# Patient Record
Sex: FEMALE | Race: WHITE | Employment: PART TIME | URBAN - METROPOLITAN AREA
[De-identification: names, ages, dates, MRNs, and addresses within clinical notes are randomized per-mention and may not be internally consistent; named-entity substitution may affect disease eponyms.]

---

## 2021-07-09 ENCOUNTER — OFFICE VISIT (OUTPATIENT)
Dept: OBGYN CLINIC | Age: 21
End: 2021-07-09
Payer: COMMERCIAL

## 2021-07-09 VITALS
DIASTOLIC BLOOD PRESSURE: 60 MMHG | HEIGHT: 64 IN | TEMPERATURE: 97.9 F | HEART RATE: 101 BPM | SYSTOLIC BLOOD PRESSURE: 108 MMHG

## 2021-07-09 DIAGNOSIS — F32.81 PMDD (PREMENSTRUAL DYSPHORIC DISORDER): ICD-10-CM

## 2021-07-09 DIAGNOSIS — N94.6 DYSMENORRHEA: ICD-10-CM

## 2021-07-09 DIAGNOSIS — Z53.8 PAP SMEAR OF CERVIX NOT NEEDED: ICD-10-CM

## 2021-07-09 DIAGNOSIS — L68.9 EXCESSIVE HAIR GROWTH: ICD-10-CM

## 2021-07-09 DIAGNOSIS — N92.6 IRREGULAR MENSES: Primary | ICD-10-CM

## 2021-07-09 DIAGNOSIS — Z87.42 HISTORY OF PCOS: ICD-10-CM

## 2021-07-09 PROCEDURE — 99203 OFFICE O/P NEW LOW 30 MIN: CPT | Performed by: OBSTETRICS & GYNECOLOGY

## 2021-07-09 RX ORDER — BUPROPION HYDROCHLORIDE 150 MG/1
150 TABLET ORAL EVERY MORNING
COMMUNITY

## 2021-07-10 LAB
BASOPHILS ABSOLUTE: 0.1 K/UL (ref 0–0.2)
BASOPHILS RELATIVE PERCENT: 0.8 %
EOSINOPHILS ABSOLUTE: 0.1 K/UL (ref 0–0.6)
EOSINOPHILS RELATIVE PERCENT: 1.2 %
ESTIMATED AVERAGE GLUCOSE: 99.7 MG/DL
GLUCOSE FASTING: 76 MG/DL (ref 70–99)
HBA1C MFR BLD: 5.1 %
HCT VFR BLD CALC: 43.7 % (ref 36–48)
HEMOGLOBIN: 14.7 G/DL (ref 12–16)
LYMPHOCYTES ABSOLUTE: 1.7 K/UL (ref 1–5.1)
LYMPHOCYTES RELATIVE PERCENT: 24.7 %
MCH RBC QN AUTO: 28.9 PG (ref 26–34)
MCHC RBC AUTO-ENTMCNC: 33.6 G/DL (ref 31–36)
MCV RBC AUTO: 86 FL (ref 80–100)
MONOCYTES ABSOLUTE: 0.4 K/UL (ref 0–1.3)
MONOCYTES RELATIVE PERCENT: 5.5 %
NEUTROPHILS ABSOLUTE: 4.7 K/UL (ref 1.7–7.7)
NEUTROPHILS RELATIVE PERCENT: 67.8 %
PDW BLD-RTO: 13.9 % (ref 12.4–15.4)
PLATELET # BLD: 262 K/UL (ref 135–450)
PMV BLD AUTO: 8.4 FL (ref 5–10.5)
PROLACTIN: 24.4 NG/ML
RBC # BLD: 5.08 M/UL (ref 4–5.2)
T4 FREE: 1.4 NG/DL (ref 0.9–1.8)
TSH REFLEX: 2.14 UIU/ML (ref 0.27–4.2)
WBC # BLD: 7 K/UL (ref 4–11)

## 2021-07-11 PROBLEM — Z87.42 HISTORY OF PCOS: Status: ACTIVE | Noted: 2021-07-11

## 2021-07-11 PROBLEM — F32.81 PMDD (PREMENSTRUAL DYSPHORIC DISORDER): Status: ACTIVE | Noted: 2021-07-11

## 2021-07-11 ASSESSMENT — ENCOUNTER SYMPTOMS
VOMITING: 0
SHORTNESS OF BREATH: 0
NAUSEA: 0
ROS SKIN COMMENTS: EXCESSIVE HAIR GROWTH
CONSTIPATION: 0
ABDOMINAL PAIN: 0
ABDOMINAL DISTENTION: 0
DIARRHEA: 0

## 2021-07-11 NOTE — PROGRESS NOTES
Subjective:      Patient ID: Cheryl Reagan is a 21 y.o. female. HPI  20 y/o [de-identified] female presents for new patient visit secondary to concerns regarding PCOS. PCOS diagnosis has been suggested in the past when seen at [address] Issues have been chronic. Menarche occurred age 15. Has been irregular since starting menses--1st 2 menses were normal followed by 1 year of amenorrhea. Menses resumed approximately 1 year ago (around the time she lost weight)  and were normal for at time but recently have been every few months. Menses last up to 7 days with varied flow--usually medium, changes pad every 3 hours. Admits to some dysmenorrhea that has increased over time. Admits to PMDD symptoms--anxiety and depression 1-2 weeks prior to [facility] agitated. Has also noted excessive hair growth--has to shave chest, stomach, chin and neck. No history of pap smear (age). Never sexually active. Review of Systems   Constitutional: Negative for activity change, appetite change, chills, fatigue, fever and unexpected weight change. Respiratory: Negative for shortness of breath. Cardiovascular: Negative for chest pain and palpitations. Gastrointestinal: Negative for abdominal distention, abdominal pain, constipation, diarrhea, nausea and vomiting. Endocrine: Negative for cold intolerance and heat intolerance. Genitourinary: Positive for menstrual problem and pelvic pain (dysmenorrhea). Negative for difficulty urinating, dysuria, frequency, genital sores, hematuria, vaginal bleeding, vaginal discharge and vaginal pain. Skin: Negative for rash. Excessive hair growth   Neurological: Negative for headaches. Hematological: Does not bruise/bleed easily. Psychiatric/Behavioral: Positive for agitation (prior to and at the time of menses) and dysphoric mood (at the time and prior to [facility]). Negative for self-injury and suicidal ideas.        Objective:   Physical Exam  Vitals and nursing note reviewed. Constitutional:       General: She is not in acute distress. Appearance: Normal appearance. She is well-developed. She is not ill-appearing, toxic-appearing or diaphoretic. Pulmonary:      Effort: Pulmonary effort is normal.   Skin:     General: Skin is warm and dry. Comments: Excessive hair growth--difficult to assess--patient shaves areas   Neurological:      Mental Status: She is alert and oriented to person, place, and time. Psychiatric:         Behavior: Behavior normal.         Thought Content: Thought content normal.         Judgment: Judgment normal.         Assessment:       Diagnosis Orders   1. Irregular menses  T4, FREE    TSH with Reflex    CBC Auto Differential    PROLACTIN    Hemoglobin A1C    Glucose, Fasting    Insulin, Fasting    Testosterone, free, total    Estrogens, Fractionated    17-HYDROXYPROGESTERONE    DHEA-Sulfate   2. Excessive hair growth  T4, FREE    TSH with Reflex    CBC Auto Differential    PROLACTIN    Hemoglobin A1C    Glucose, Fasting    Insulin, Fasting    Testosterone, free, total    Estrogens, Fractionated    17-HYDROXYPROGESTERONE    DHEA-Sulfate   3. History of PCOS     4. PMDD (premenstrual dysphoric disorder)     5. Dysmenorrhea     6. Pap smear of cervix not needed             Plan:      Orders Placed This Encounter   Procedures    T4, FREE    TSH with Reflex    CBC Auto Differential    PROLACTIN    Hemoglobin A1C    Glucose, Fasting    Insulin, Fasting    Testosterone, free, total    Estrogens, Fractionated    17-HYDROXYPROGESTERONE    DHEA-Sulfate     Await lab results. Schedule ultrasound and follow up  Options for possible treatment of PCOS, dysmenorrhea discussed: surveillance, hormonal therapy (COCs, Nuvaring, Orth Evra, etc), weight management, and use of metformin.            Kee Richardson DO

## 2021-07-13 LAB
ESTRADIOL LEVEL: 114.1 PG/ML
ESTROGEN TOTAL: 241 PG/ML
ESTRONE: 126.9 PG/ML
SEX HORMONE BINDING GLOBULIN: 13 NMOL/L (ref 30–135)
TESTOSTERONE FREE-NONMALE: 7.8 PG/ML (ref 0.8–7.4)
TESTOSTERONE TOTAL: 28 NG/DL (ref 20–70)

## 2021-07-14 LAB
17-OH PROGESTERONE LCMS: 101.18 NG/DL
DHEAS (DHEA SULFATE): 100 UG/DL (ref 65–380)

## 2021-08-24 ENCOUNTER — OFFICE VISIT (OUTPATIENT)
Dept: OBGYN CLINIC | Age: 21
End: 2021-08-24
Payer: COMMERCIAL

## 2021-08-24 VITALS
HEART RATE: 78 BPM | WEIGHT: 182.2 LBS | TEMPERATURE: 98 F | SYSTOLIC BLOOD PRESSURE: 118 MMHG | BODY MASS INDEX: 31.27 KG/M2 | DIASTOLIC BLOOD PRESSURE: 68 MMHG

## 2021-08-24 DIAGNOSIS — N94.6 DYSMENORRHEA: ICD-10-CM

## 2021-08-24 DIAGNOSIS — F32.81 PMDD (PREMENSTRUAL DYSPHORIC DISORDER): ICD-10-CM

## 2021-08-24 DIAGNOSIS — E28.2 PCOS (POLYCYSTIC OVARIAN SYNDROME): Primary | ICD-10-CM

## 2021-08-24 DIAGNOSIS — Z87.42 HISTORY OF PCOS: ICD-10-CM

## 2021-08-24 DIAGNOSIS — L68.0 HIRSUTISM: ICD-10-CM

## 2021-08-24 DIAGNOSIS — N92.6 MENSES, IRREGULAR: Primary | ICD-10-CM

## 2021-08-24 PROCEDURE — 76856 US EXAM PELVIC COMPLETE: CPT | Performed by: OBSTETRICS & GYNECOLOGY

## 2021-08-24 PROCEDURE — 99213 OFFICE O/P EST LOW 20 MIN: CPT | Performed by: OBSTETRICS & GYNECOLOGY

## 2021-08-24 RX ORDER — FLUOXETINE 20 MG/1
1 TABLET ORAL DAILY PRN
Qty: 30 TABLET | Refills: 1 | Status: SHIPPED | OUTPATIENT
Start: 2021-08-24

## 2021-08-28 NOTE — PROGRESS NOTES
Subjective:      Patient ID: Geni Márquez is a 21 y.o. female. HPI  22 y/o [de-identified] female presents for ultrasound and follow up secondary to PCOS. PCOS diagnosis has been suggested in the past when seen at [place] [address]. Issues have been chronic. Menarche occurred age 15. Has been irregular since starting menses--1st 2 menses were normal followed by 1 year of amenorrhea. Menses resumed approximately 1 year ago (around the time she lost weight)  and were normal for at time but recently have been every few months. Menses last up to 7 days with varied flow--usually medium, changes pad every 3 hours. Admits to some dysmenorrhea that has increased over time. Most recent menses was really heavy at first.    Admits to PMDD symptoms--anxiety and depression 1-2 weeks prior to [facility] agitated. Patient was very emotional last week. Has also noted excessive hair growth--has to shave chest, stomach, chin and neck. No history of pap smear (age). Never sexually active. Review of Systems   Constitutional: Negative. Negative for activity change, appetite change, chills, fatigue, fever and unexpected weight change. Respiratory: Negative for shortness of breath. Cardiovascular: Negative for chest pain. Gastrointestinal: Negative for abdominal distention, abdominal pain, constipation, diarrhea, nausea and vomiting. Genitourinary: Positive for menstrual problem. Negative for difficulty urinating, dysuria, hematuria, vaginal bleeding, vaginal discharge and vaginal pain. Psychiatric/Behavioral: Positive for dysphoric mood. The patient is nervous/anxious. Objective:   Physical Exam  Vitals and nursing note reviewed. Constitutional:       General: She is not in acute distress. Appearance: She is well-developed. She is not ill-appearing, toxic-appearing or diaphoretic. Skin:     General: Skin is warm and dry.    Neurological:      Mental Status: She is alert and oriented to person, place, and time. Psychiatric:         Behavior: Behavior normal.         Thought Content: Thought content normal.         Judgment: Judgment normal.       PELVIC ULTRASOUND without DOPPLER INTERROGATION   NON OB    DATE: 08/24/2021    PHYSICIAN: JEFFY Richardson D.O.     SONOGRAPHER: Mayito Bravo RDMS    INDICATION: pelvic pain    TYPE OF SCAN: abdominal    FINDINGS:    The cul de sac is normal. No free fluid appreciated. The cervix is normal and not enlarged. Nabothian cyst/s is not noted within the uterine cervix. The uterus measures 6.97 cm x 5.41 cm x 3.15 cm. The uterus is anteverted. The endometrium measures 4.49 mm. The myometrium is homogeneous in appearance. No uterine anomalies are noted. The right ovary is present and normal.  The right ovary measures 2.74 cm x 1.84 cm x 2.18 cm. Ovary findings: No masses seen. The right adnexa is normal.  The left ovary is present and normal.  The left ovary measures 3.12 cm x 2.63 cm x 1.77 cm. Ovary findings: No masses seen. The left adnexa is normal.      IMPRESSION: Normal uterus. Normal right ovary. Normal left ovary. Imaging is limited secondary to bowel gas as well as, transabdominal approach. The patient is well aware of the limitations of ultrasound in the detection of anomalies of the abdomen and pelvis. 7/14/2021 12:34 PM - Sage Manges Incoming Lab Results From Soft (Epic Adt)    Component Value Ref Range & Units Status Collected Lab   DHEAS (DHEA Sulfate) 100.0  65 - 380 ug/dL Final 07/09/2021  4:21 PM [address] [place] [address] [phone]     7/14/2021  1:44 PM - [facility] Incoming Lab Results From Soft (Epic Adt)    Component Value Ref Range & Units Status Collected Lab   17-OH PROGESTERONE LCMS 101.18  <=206.00 ng/dL Final 07/09/2021  4:21 PM [facility]   INTERPRETIVE INFORMATION for 17-Hydroxyprogesterone in females:    Follicular                  15 to 70 ng/dL   Luteal                      35 to 290 ng/dL   REFERENCE INTERVAL: 17-Hydroxyprogesterone Qnt, HPLC-MS/MS   Access complete set of age- and/or gender-specific reference intervals   for   this test in the [facility] Test Directory ([employer]). This test was developed and its performance characteristics determined   by   Yung Moreno. It has not been cleared or approved by the [employer]   and   Drug Administration. This test was performed in a CLIA certified   laboratory   and is intended for clinical purposes. Performed By: Yung Collins 88   [place], [address] [facility]   : Bird Saldivar. Jen Dalton MD      7/13/2021  8:16 AM - [facility] Incoming Lab Results From Soft (Epic Adt)    Component Value Ref Range & Units Status Collected Lab   Estradiol 114.1  pg/mL Final 07/09/2021  4:21 PM [facility]   REFERENCE INTERVAL: Estradiol by [address] [employer]   [employer] complete set of age- and/or gender-specific reference intervals   for   this test in the [facility] Test Directory ([employer]). This test was developed and its performance characteristics determined   by   Yung Moreno. It has not been cleared or approved by the [employer]   and   Drug Administration. This test was performed in a CLIA certified   laboratory   and is intended for clinical purposes. Estrone 126.9  pg/mL Final 07/09/2021  4:21 PM [facility]   REFERENCE INTERVAL: Estrone by [address] [employer]   [employer] complete set of age- and/or gender-specific reference intervals   for   this test in the [facility] Test Directory ([employer]). This test was developed and its performance characteristics determined   by   Yung Moreno. It has not been cleared or approved by the [employer]   and   Drug Administration. This test was performed in a CLIA certified   laboratory   and is intended for clinical purposes.     Estrogen Total 241.0  pg/mL Final 07/09/2021  4:21 PM [facility]   Females: (pg/mL)   Estrone      Estradiol    Total Estrogens   Early follicular   <721.1        30.0-100.0   59.9-353.0   Late follicular     149.3-757.6  100.0-400.0  200.0-650.0   Luteal             <200.0        50.0-150.0   50.0-350.0   Post-menopausal     3.0-32.0     2.0-21.0     5.0-52.0   REFERENCE INTERVAL: Estrogens Total Calculation   Access complete set of age- and/or gender-specific reference intervals   for   this test in the [facility] Test Directory ([email or web address]). Performed By: Alan Collins 88   [place], [address] [facility]   : Bernard Deluna MD    Testing Performed By    [address] Name Director Address Valid Date Range   [address]  MD Dennis Mojica [address]   [facility] [place] [address] [address] 12/16/20 1125-Present   Narrative  Performed by: [address] Lab  Referred out by:   [facility]   [address] [facility] [address]   Phone [phone]   Lab and Collection    Estrogens, Fractionated - 7/9/2021  Result History    Estrogens, Fractionated on 7/13/2021 7/13/2021  4:38 PM - [facility] Lab Results From Soft (Epic Adt)    Component Value Ref Range & Units Status Collected Lab   Testosterone 28  20 - 70 ng/dL Final 07/09/2021  4:21 PM [facility]   Sex Hormone Binding 13Low   30 - 135 nmol/L Final 07/09/2021  4:21 PM [facility]   Testosterone, Free 7. 8High   0.8 - 7.4 pg/mL Final 07/09/2021  4:21 PM Tavon 97 concentration of free testosterone is derived from a mathematical   expression based on the constant for the binding of testosterone to   albumin and/or sex hormone binding globulin.      [facility] [address] [facility], [address] [place] [address] [phone]    Testing Performed By    [address] Name Director Address Valid Date Range   [address] Ordoñez MD [facility] [address] [address] 08/30/17 0801-Present   Narrative  Performed by: [address] Lab  Referred out by:   [facility]   [address] [facility] [address]   Phone [phone]     7/10/2021  7:50 AM - Yash Wolff Incoming Lab Results From Soft (Epic Adt)    Component Value Ref Range & Units Status Collected Lab   Glucose, Fasting 76  70 - 99 mg/dL Final 07/09/2021  4:21 PM [address] Lab     7/10/2021  9:51 AM - Yash Wolff Incoming Lab Results From Soft (Epic Adt)    Component Value Ref Range & Units Status Collected Lab   Hemoglobin A1C 5.1  See comment % Final 07/09/2021  4:21 PM [address] Lab     7/10/2021  7:50 AM - Ariaa New Incoming Lab Results From Soft (Epic Adt)    Component Value Ref Range & Units Status Collected Lab   Prolactin 24.4  ng/mL Final 07/09/2021  4:21 PM [address] [facility] [facility]   Default Normal Ranges     Female                     Male   Nonpregnant: 2.8-29. 2      2.1-17.7   Pregnant:  9.7-208.5   Postmeno:  1.8-20.3    Testing Performed By    Lab - Abbreviation Name Director Address Valid Date Range   19 - 774 [facility] LAB Lisa Hairston M.D. [address] [place] [address] 09/26/20 0000-Present     7/10/2021  6:10 AM - [facility] Incoming Lab Results From Soft (Epic Adt)    Component Value Ref Range & Units Status Collected Lab   WBC 7.0  4.0 - 11.0 K/uL Final 07/09/2021  4:21 PM [address] [facility]   RBC 5.08  4.00 - 5.20 M/uL Final 07/09/2021  4:21 PM [address] Lab   Hemoglobin 14.7  12.0 - 16.0 g/dL Final 07/09/2021  4:21 PM [address] Lab   Hematocrit 43.7  36.0 - 48.0 % Final 07/09/2021  4:21 PM [facility] Lab   MCV 86.0  80.0 - 100.0 fL Final 07/09/2021  4:21 PM [facility] Lab   MCH 28.9  26.0 - 34.0 pg Final 07/09/2021  4:21 PM [address] Lab   MCHC 33.6  31.0 - 36.0 g/dL Final 07/09/2021  4:21 PM [address] Lab   RDW 13.9  12.4 - 15.4 % Final 07/09/2021  4:21 PM  - [facility] Lab   Platelets 961  080 - 450 K/uL Final 07/09/2021  4:21 PM [address] Lab   MPV 8.4  5.0 - 10.5 fL Final 07/09/2021  4:21 PM [address] Lab   Neutrophils % 67.8  % Final 07/09/2021  4:21 PM [address] Lab   Lymphocytes % 24.7  % Final 07/09/2021  4:21 PM [address] Lab   Monocytes % 5.5  % Final 07/09/2021  4:21 PM [address] Lab   Eosinophils % 1.2  % Final 07/09/2021  4:21 PM [address] Lab   Basophils % 0.8  % Final 07/09/2021  4:21 PM [address] Lab   Neutrophils Absolute 4.7  1.7 - 7.7 K/uL Final 07/09/2021  4:21 PM [facility] Lab   Lymphocytes Absolute 1.7  1.0 - 5.1 K/uL Final 07/09/2021  4:21 PM [facility] Lab   Monocytes Absolute 0.4  0.0 - 1.3 K/uL Final 07/09/2021  4:21 PM [facility] Lab   Eosinophils Absolute 0.1  0.0 - 0.6 K/uL Final 07/09/2021  4:21 PM [facility] Lab   Basophils Absolute 0.1  0.0 - 0.2 K/uL Final 07/09/2021  4:21 PM [address] Lab   Testing Performed By    [address] Arguelles Name Director Address Valid Date Range   19- - 790 [facility] Lisa Hairston M.D. [address] [place] [address] 09/26/20 0000-Present   Narrative  Performed by: [address] Lab  Performed at:   [facility]   [address] [place] [address]   Phone [phone]     7/10/2021  7:49 AM - Yash Wolff Incoming Lab Results From Soft (Epic Adt)    Component Value Ref Range & Units Status Collected Lab   TSH 2.14  0.27 - 4.20 uIU/mL Final 07/09/2021  4:21 PM [address] Lab       Assessment:       Diagnosis Orders   1. Menses, irregular     2. Hirsutism     3. History of PCOS     4. PMDD (premenstrual dysphoric disorder)     5. Dysmenorrhea             Plan:      Ultrasound result reviewed. Options for treatment of PCOS reviewed--see previous notes. Rx trial of Sarafem  Menstrual diary  Repeat testosterone level in 6 months.     Consider intervention/further testing depending on result.            Kevan Richardson, DO

## 2021-08-29 ASSESSMENT — ENCOUNTER SYMPTOMS
NAUSEA: 0
DIARRHEA: 0
VOMITING: 0
ABDOMINAL DISTENTION: 0
CONSTIPATION: 0
ABDOMINAL PAIN: 0
SHORTNESS OF BREATH: 0

## 2021-08-31 PROBLEM — L68.0 HIRSUTISM: Status: ACTIVE | Noted: 2021-08-31

## 2021-08-31 PROBLEM — N94.6 DYSMENORRHEA: Status: ACTIVE | Noted: 2021-08-31

## 2021-08-31 PROBLEM — N92.6 MENSES, IRREGULAR: Status: ACTIVE | Noted: 2021-08-31

## 2021-10-26 ENCOUNTER — HOSPITAL ENCOUNTER (EMERGENCY)
Age: 21
Discharge: LEFT BEFORE DISCHARGE | End: 2021-10-26
Payer: COMMERCIAL

## 2021-10-26 ENCOUNTER — APPOINTMENT (OUTPATIENT)
Dept: GENERAL RADIOLOGY | Age: 21
End: 2021-10-26
Payer: COMMERCIAL

## 2021-10-26 VITALS
HEART RATE: 74 BPM | OXYGEN SATURATION: 99 % | BODY MASS INDEX: 31.01 KG/M2 | DIASTOLIC BLOOD PRESSURE: 91 MMHG | TEMPERATURE: 97.6 F | SYSTOLIC BLOOD PRESSURE: 176 MMHG | RESPIRATION RATE: 16 BRPM | WEIGHT: 175 LBS | HEIGHT: 63 IN

## 2021-10-26 DIAGNOSIS — R55 SYNCOPE AND COLLAPSE: Primary | ICD-10-CM

## 2021-10-26 DIAGNOSIS — M25.551 RIGHT HIP PAIN: ICD-10-CM

## 2021-10-26 DIAGNOSIS — S99.922A INJURY OF TOE ON LEFT FOOT, INITIAL ENCOUNTER: ICD-10-CM

## 2021-10-26 DIAGNOSIS — S09.90XA CLOSED HEAD INJURY, INITIAL ENCOUNTER: ICD-10-CM

## 2021-10-26 DIAGNOSIS — S16.1XXA STRAIN OF NECK MUSCLE, INITIAL ENCOUNTER: ICD-10-CM

## 2021-10-26 DIAGNOSIS — M25.512 ACUTE PAIN OF LEFT SHOULDER: ICD-10-CM

## 2021-10-26 DIAGNOSIS — S00.01XA ABRASION OF SCALP, INITIAL ENCOUNTER: ICD-10-CM

## 2021-10-26 PROCEDURE — 99283 EMERGENCY DEPT VISIT LOW MDM: CPT

## 2021-10-26 ASSESSMENT — PAIN DESCRIPTION - LOCATION: LOCATION: HEAD

## 2021-10-26 ASSESSMENT — PAIN SCALES - GENERAL: PAINLEVEL_OUTOF10: 4

## 2021-10-27 NOTE — ED PROVIDER NOTES
[facility] Emergency Department    CHIEF COMPLAINT  Head Injury (Patient states she passed out on Sunday and hit head.)      SHARED SERVICE VISIT  Evaluated by LILLI. My supervising physician was available for consultation. HISTORY OF Nestor Antonio is a 21 y.o. female who presents to the ED complaining of syncopal episode with injuries. Patient reports being dropped off by her mother today for evaluation. Patient reports that she was under a lot of stress and doing a lot of thinking when she passed out. Reports that she did strike head. Has some mild neck discomfort as well as left shoulder and left pinky toe pain. She reports that when she struck her head her hearing on the left did cause a mild abrasion to the scalp. Reports that today when looking at this pulled on the area and reports scab opened and she passed out again. States that she injured right hip in the fall. Reports that she has been getting lightheaded and dizzy more often. Has never been evaluated. She denies any current headaches, dizziness or confusion. No visual changes or disturbances. No difficulty speaking or swallowing. She has had no numbness, tingling, weakness of extremities. No chest pain shortness of breath. No nausea or vomiting. No diarrhea or constipation. No history of seizures. She denies urinary symptoms. Last normal menstrual cycle approximately 1 month ago. No vaginal bleeding, pain or discharge. Uses no oral contraceptives. Denies leg pain or swelling. No recent travel, trauma or surgery. No other complaints, modifying factors or associated symptoms. Nursing notes reviewed. History reviewed. No pertinent past medical history. History reviewed. No pertinent surgical history.   Family History   Problem Relation Age of Onset    Diabetes Paternal Grandfather     Diabetes Paternal Grandmother     Diabetes Maternal Grandmother     Diabetes Maternal Grandfather      Social History     Socioeconomic History    Marital status: Single     Spouse name: Not on file    Number of children: Not on file    Years of education: Not on file    Highest education level: Not on file   Occupational History    Not on file   Tobacco Use    Smoking status: Never Smoker    Smokeless tobacco: Never Used   Substance and Sexual Activity    Alcohol use: No    Drug use: No    Sexual activity: Not on file   Other Topics Concern    Not on file   Social History Narrative    Not on file     Social Determinants of Health     Financial Resource Strain:     Difficulty of Paying Living Expenses:    Food Insecurity:     Worried About Running Out of Food in the Last Year:     [address] in the Last Year:    Transportation Needs:     Lack of Transportation (Medical):  Lack of Transportation (Non-Medical):    Physical Activity:     Days of Exercise per Week:     Minutes of Exercise per Session:    Stress:     Feeling of Stress :    Social Connections:     Frequency of Communication with Friends and Family:     Frequency of Social Gatherings with Friends and Family:     Attends [employer] Services:     Active Member of Clubs or Organizations:     Attends Club or Organization Meetings:     Marital Status:    Intimate Partner Violence:     Fear of Current or Ex-Partner:     Emotionally Abused:     Physically Abused:     Sexually Abused:      No current facility-administered medications for this encounter.      Current Outpatient Medications   Medication Sig Dispense Refill    FLUoxetine HCl, PMDD, (SARAFEM) 20 MG TABS Take 1 tablet by mouth daily as needed (mood swings) 30 tablet 1    buPROPion (WELLBUTRIN XL) 150 MG extended release tablet Take 150 mg by mouth every morning      omeprazole (PRILOSEC) 20 MG delayed release capsule Take 20 mg by mouth daily       No Known Allergies    REVIEW OF SYSTEMS  10 systems reviewed, pertinent positives per HPI otherwise noted to be negative    PHYSICAL EXAM  BP (!) 176/91   Pulse 74   Temp 97.6 °F (36.4 °C) (Oral)   Resp 16   Ht 5' 3\" (1.6 m)   Wt 175 lb (79.4 kg)   LMP 10/05/2021   SpO2 99%   BMI 31.00 kg/m²   GENERAL APPEARANCE: Awake and alert. Cooperative. No acute distress. HEAD: Normocephalic. Atraumatic. No hematomas, lesions or lacerations. There is mild abrasion near mastoid region. Bleeding well controlled without signs of foreign bodies. No underlying bony tenderness. Negative for burton signs or raccoon's eyes. EYES: PERRL. EOM's grossly intact. No periorbital edema or erythema. No proptosis. No globe tenderness. No blood noted to anterior chambers. ENT: Mucous membranes are moist.  No oral lesions lacerations. No TMJ pain or malocclusion. No hemotympanum. Paullette Rakes NECK: Supple. No midline bony tenderness. No crepitus, deformity, or step-off noted. No swelling, bruising, color change. Tenderness to paraspinal musculature to left of midline. HEART: RRR. No murmurs. No chest wall tenderness. LUNGS: Respirations unlabored. CTAB. Good air exchange. Speaking comfortably in full sentences. No wheezing, rhonchi, rales. ABDOMEN: Soft. Non-distended. Non-tender. No guarding or rebound. No midline pulsatile mass. EXTREMITIES: No peripheral edema. Mild tenderness to the left shoulder and right hip without deformity or step-off. Normal range of motion and strength testing. No evidence for dislocation subluxation. Left fifth toe contused with mild soft tissue swelling and global tenderness. Radial and pedal pulses +2 and cap refill less than 5 seconds. Moves all extremities equally. All extremities neurovascularly intact. SKIN: Warm and dry. No acute rashes. NEUROLOGICAL: Alert and oriented. CN's 2-12 intact. No gross facial drooping. Strength 5/5, sensation intact. PSYCHIATRIC: Normal mood and affect.     ED COURSE/MDM/DISPOSITION  Pain control was not required while here in the emergency department. Triage vitals stable. I had x-ray imaging as well as lab work and EKG ordered. Patient had eloped from the emergency department prior to labs and imaging being obtained.          Lady Lin [place]  10/26/21 9938

## 2024-03-12 ENCOUNTER — OFFICE VISIT (OUTPATIENT)
Dept: URGENT CARE | Age: 24
End: 2024-03-12

## 2024-03-12 VITALS
TEMPERATURE: 98.5 F | OXYGEN SATURATION: 97 % | BODY MASS INDEX: 34.38 KG/M2 | HEART RATE: 76 BPM | DIASTOLIC BLOOD PRESSURE: 78 MMHG | WEIGHT: 194 LBS | HEIGHT: 63 IN | SYSTOLIC BLOOD PRESSURE: 124 MMHG
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
## 2024-03-12 DIAGNOSIS — L50.0 ALLERGIC URTICARIA: Primary | ICD-10-CM

## 2024-03-12 RX ORDER — METHYLPREDNISOLONE 4 MG/1
TABLET ORAL
Qty: 1 KIT | Refills: 0 | Status: SHIPPED | OUTPATIENT
Start: 2024-03-12

## 2024-03-12 NOTE — PATIENT INSTRUCTIONS
Medrol Dose Pack prescribed for oral steroid treatment of the rash  Recommend starting daily oral antihistamine (Claritin, Zyrtec, Allegra, or Xyzal) for continued treatment against the likely allergy against your rat.  After the oral steroid has finished, can use topical Hydrocortisone cream to help with any remaining or new rash.  Recommend following up with your PCP regarding the rash to help further treatment with your likely allergy to the rat, and for possible follow up with an allergist.    Diarrhea concerning more for environmental illness - continue pushing fluids and eating a simple diet - if symptoms fail to resolve, or show signs of worsening, follow up with your PCP for further evaluation and treatment.    If you develop signs of worsening rash, facial swelling, mouth swelling, tongue or throat swelling, difficulty swallowing, difficulty breathing, or chest pain, follow up with the ER for further evaluation and treatment for a possible anaphylactic allergy reaction.    If pain becomes severe, or localizes to the lower right side of the abdomen, if dark-tary stools develop, if unresolving vomiting, if blood in noted in stool or vomit, or if you become concerned for dehydration follow up with the emergency room.    New Prescriptions    METHYLPREDNISOLONE (MEDROL DOSEPACK) 4 MG TABLET    Take by mouth.

## 2024-03-12 NOTE — PROGRESS NOTES
Sarah Bruno (: 2000) is a 23 y.o. female, New patient, here for evaluation of the following chief complaint(s):  Rash (Noticed it Saturday. Pt states she got her rat on the  of this Month and he had mites and she took him to the vet and it was tested positive for Irina on the . Pt states with the rash she is also congestion and sneezing and her eyes have been dry and puffy. Pt also states GI upset. Pt also states rash is on her arms and neck and stomach and its itchy. )      ASSESSMENT/PLAN:    ICD-10-CM    1. Allergic urticaria  L50.0 methylPREDNISolone (MEDROL DOSEPACK) 4 MG tablet      2. Other contact with rat, sequela  W53.19XS           Exam and history consistent with urticaria - given presence of the symptoms only noted over areas frequently in contact with the pt's new pet Rat, concern for allergy to the pt's new pet.  Low concern for contact dermatitis, burns, cellulitis, folliculitis, and ringworm.  Pt's diarrhea possibly due to GI infection vs secondary to the pt's allergy - discussed continued monitoring and f/u with the pts PCP if diarrhea persists beyond 1 week.  Medrol dosepack prescribed for systemic treatment  Recommend OTC oral antihistamines to help with itching   Discussed use of topical OTC steroid creams to help with any remaining or new rashes  Recommended PCP follow up to further discuss likely allergy to pt's new pet.  Provided strict ED follow up instructions for any worsening symptoms, especially those concerning for anaphylaxis.    The patient tolerated their visit well. The patient and/or the family were informed of the results of any tests, a time was given to answer questions, a plan was proposed and they agreed with plan. Reviewed AVS with treatment instructions and answered questions - pt/family expresses understanding and agreement with the discussed treatment plan and AVS instructions.    SUBJECTIVE/OBJECTIVE:  HPI:   23 y.o. female presents for complaint

## 2024-03-13 ASSESSMENT — VISUAL ACUITY: OU: 1

## 2025-07-02 ENCOUNTER — OFFICE VISIT (OUTPATIENT)
Dept: INTERNAL MEDICINE CLINIC | Age: 25
End: 2025-07-02
Payer: COMMERCIAL

## 2025-07-02 VITALS
OXYGEN SATURATION: 98 % | SYSTOLIC BLOOD PRESSURE: 129 MMHG | HEART RATE: 76 BPM | TEMPERATURE: 98.1 F | DIASTOLIC BLOOD PRESSURE: 88 MMHG | BODY MASS INDEX: 34.09 KG/M2 | WEIGHT: 192.4 LBS | HEIGHT: 63 IN

## 2025-07-02 DIAGNOSIS — L84 CALLUS OF FOOT: ICD-10-CM

## 2025-07-02 DIAGNOSIS — M79.89 LEG SWELLING: ICD-10-CM

## 2025-07-02 DIAGNOSIS — F33.0 MILD EPISODE OF RECURRENT MAJOR DEPRESSIVE DISORDER: Primary | ICD-10-CM

## 2025-07-02 PROCEDURE — 99204 OFFICE O/P NEW MOD 45 MIN: CPT

## 2025-07-02 RX ORDER — OMEPRAZOLE 20 MG/1
20 CAPSULE, DELAYED RELEASE ORAL DAILY
Qty: 90 CAPSULE | Refills: 1 | Status: SHIPPED | OUTPATIENT
Start: 2025-07-02

## 2025-07-02 RX ORDER — SERTRALINE HYDROCHLORIDE 25 MG/1
25 TABLET, FILM COATED ORAL DAILY
Qty: 30 TABLET | Refills: 1 | Status: SHIPPED | OUTPATIENT
Start: 2025-07-02

## 2025-07-02 SDOH — ECONOMIC STABILITY: FOOD INSECURITY: WITHIN THE PAST 12 MONTHS, THE FOOD YOU BOUGHT JUST DIDN'T LAST AND YOU DIDN'T HAVE MONEY TO GET MORE.: NEVER TRUE

## 2025-07-02 SDOH — ECONOMIC STABILITY: FOOD INSECURITY: WITHIN THE PAST 12 MONTHS, YOU WORRIED THAT YOUR FOOD WOULD RUN OUT BEFORE YOU GOT MONEY TO BUY MORE.: NEVER TRUE

## 2025-07-02 ASSESSMENT — PATIENT HEALTH QUESTIONNAIRE - PHQ9
6. FEELING BAD ABOUT YOURSELF - OR THAT YOU ARE A FAILURE OR HAVE LET YOURSELF OR YOUR FAMILY DOWN: SEVERAL DAYS
2. FEELING DOWN, DEPRESSED OR HOPELESS: SEVERAL DAYS
10. IF YOU CHECKED OFF ANY PROBLEMS, HOW DIFFICULT HAVE THESE PROBLEMS MADE IT FOR YOU TO DO YOUR WORK, TAKE CARE OF THINGS AT HOME, OR GET ALONG WITH OTHER PEOPLE: SOMEWHAT DIFFICULT
SUM OF ALL RESPONSES TO PHQ QUESTIONS 1-9: 9
8. MOVING OR SPEAKING SO SLOWLY THAT OTHER PEOPLE COULD HAVE NOTICED. OR THE OPPOSITE, BEING SO FIGETY OR RESTLESS THAT YOU HAVE BEEN MOVING AROUND A LOT MORE THAN USUAL: NOT AT ALL
3. TROUBLE FALLING OR STAYING ASLEEP: SEVERAL DAYS
SUM OF ALL RESPONSES TO PHQ QUESTIONS 1-9: 10
9. THOUGHTS THAT YOU WOULD BE BETTER OFF DEAD, OR OF HURTING YOURSELF: SEVERAL DAYS
5. POOR APPETITE OR OVEREATING: MORE THAN HALF THE DAYS
4. FEELING TIRED OR HAVING LITTLE ENERGY: SEVERAL DAYS
SUM OF ALL RESPONSES TO PHQ QUESTIONS 1-9: 10
SUM OF ALL RESPONSES TO PHQ QUESTIONS 1-9: 10
1. LITTLE INTEREST OR PLEASURE IN DOING THINGS: NEARLY EVERY DAY
7. TROUBLE CONCENTRATING ON THINGS, SUCH AS READING THE NEWSPAPER OR WATCHING TELEVISION: NOT AT ALL

## 2025-07-02 ASSESSMENT — COLUMBIA-SUICIDE SEVERITY RATING SCALE - C-SSRS
2. HAVE YOU ACTUALLY HAD ANY THOUGHTS OF KILLING YOURSELF?: NO
6. HAVE YOU EVER DONE ANYTHING, STARTED TO DO ANYTHING, OR PREPARED TO DO ANYTHING TO END YOUR LIFE?: NO
1. WITHIN THE PAST MONTH, HAVE YOU WISHED YOU WERE DEAD OR WISHED YOU COULD GO TO SLEEP AND NOT WAKE UP?: YES

## 2025-07-02 NOTE — PROGRESS NOTES
[facility] Office  [address]  [address]  [place], [place] [address]  Tel: [phone]    Sarah Bruno  2000  female    CC:   Chief Complaint   Patient presents with    New Patient       HPI:   Patient presents today to establish care.  Has some complaints about a bump on her left leg that can be tender, possible callus/plantar wart on her midfoot as well as depression.    1.5 years ago saw podiatry they were recommending achilles lengthening.  Formed callus in between the balls of her feet.  Tried to shave it but patient was not able to tolerate.  Patient states she picks at it to but it tends to come back.  Has not tried anything over-the-counter.    Bump on her left left leg chronic.  States at times it does become slightly tender.  Not overall increasing in size.  Denies any unintended weight changes.    Also history of PCOS.  Did see OB/GYN last year, reviewed office note.  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    Previously on Wellbutrin and fluoxetine for her mood.  Denies just use any sleep or appetite, symptoms concentration.  Does have feelings of death at times.  States she has a history of self-harm ideation but no suicide attempts.  Not currently SI/HI.    Family Hx:  I see her mother    Social Hx:  Works in [employer]. Smokes marijuana. No tobacco. No alcohol. Single, no kids. Lives with boyfriend.     No past medical history on file.    No past surgical history on file.    Family History   Problem Relation Age of Onset    Diabetes Paternal Grandfather     Diabetes Paternal Grandmother     Diabetes Maternal Grandmother     Diabetes Maternal Grandfather         No Known Allergies      There is no immunization history on file for this patient.    Current Outpatient Medications   Medication Sig Dispense Refill    omeprazole (PRILOSEC) 20 MG delayed release capsule Take 1 capsule by mouth daily 90 capsule 1    sertraline (ZOLOFT) 25 MG tablet

## 2025-08-06 ENCOUNTER — OFFICE VISIT (OUTPATIENT)
Dept: INTERNAL MEDICINE CLINIC | Age: 25
End: 2025-08-06
Payer: COMMERCIAL

## 2025-08-06 VITALS
BODY MASS INDEX: 35.25 KG/M2 | TEMPERATURE: 97.2 F | WEIGHT: 199 LBS | HEART RATE: 62 BPM | OXYGEN SATURATION: 98 % | DIASTOLIC BLOOD PRESSURE: 77 MMHG | SYSTOLIC BLOOD PRESSURE: 113 MMHG

## 2025-08-06 DIAGNOSIS — Z11.4 ENCOUNTER FOR SCREENING FOR HIV: ICD-10-CM

## 2025-08-06 DIAGNOSIS — Z13.0 SCREENING FOR DEFICIENCY ANEMIA: ICD-10-CM

## 2025-08-06 DIAGNOSIS — Z13.29 THYROID DISORDER SCREEN: ICD-10-CM

## 2025-08-06 DIAGNOSIS — Z13.1 DIABETES MELLITUS SCREENING: ICD-10-CM

## 2025-08-06 DIAGNOSIS — Z11.59 NEED FOR HEPATITIS C SCREENING TEST: ICD-10-CM

## 2025-08-06 DIAGNOSIS — F34.1 DYSTHYMIA: Primary | ICD-10-CM

## 2025-08-06 DIAGNOSIS — Z13.228 SCREENING FOR METABOLIC DISORDER: ICD-10-CM

## 2025-08-06 DIAGNOSIS — B07.0 PLANTAR WART: ICD-10-CM

## 2025-08-06 PROCEDURE — 99214 OFFICE O/P EST MOD 30 MIN: CPT

## 2025-08-06 PROCEDURE — 17110 DESTRUCTION B9 LES UP TO 14: CPT
